# Patient Record
Sex: MALE | Race: ASIAN | NOT HISPANIC OR LATINO | URBAN - METROPOLITAN AREA
[De-identification: names, ages, dates, MRNs, and addresses within clinical notes are randomized per-mention and may not be internally consistent; named-entity substitution may affect disease eponyms.]

---

## 2020-02-27 ENCOUNTER — OFFICE VISIT - HEALTHEAST (OUTPATIENT)
Dept: CARDIOLOGY | Facility: CLINIC | Age: 40
End: 2020-02-27

## 2020-02-27 DIAGNOSIS — I34.0 MITRAL VALVE INSUFFICIENCY, UNSPECIFIED ETIOLOGY: ICD-10-CM

## 2020-03-17 ENCOUNTER — COMMUNICATION - HEALTHEAST (OUTPATIENT)
Dept: CARDIOLOGY | Facility: CLINIC | Age: 40
End: 2020-03-17

## 2020-03-18 ENCOUNTER — HOSPITAL ENCOUNTER (OUTPATIENT)
Dept: CARDIOLOGY | Facility: CLINIC | Age: 40
Discharge: HOME | End: 2020-03-18
Attending: INTERNAL MEDICINE

## 2020-03-18 DIAGNOSIS — I34.0 MITRAL VALVE INSUFFICIENCY, UNSPECIFIED ETIOLOGY: ICD-10-CM

## 2020-03-18 LAB
AORTIC ROOT: 3 CM
BSA FOR ECHO PROCEDURE: 1.87 M2
CV BLOOD PRESSURE: ABNORMAL MMHG
CV ECHO HEIGHT: 67 IN
CV ECHO WEIGHT: 164 LBS
DOP CALC LVOT AREA: 2.83 CM2
DOP CALC LVOT DIAMETER: 1.9 CM
DOP CALC LVOT PEAK VEL: 91.9 CM/S
DOP CALC LVOT STROKE VOLUME: 48.5 CM3
DOP CALCLVOT PEAK VEL VTI: 17.1 CM
EJECTION FRACTION: 62 % (ref 55–75)
FRACTIONAL SHORTENING: 35.5 % (ref 28–44)
INTERVENTRICULAR SEPTUM IN END DIASTOLE: 1.03 CM (ref 0.6–1)
IVS/PW RATIO: 0.8
LA AREA 1: 33.4 CM2
LA AREA 2: 28.2 CM2
LEFT ATRIUM LENGTH: 6.18 CM
LEFT ATRIUM SIZE: 4.2 CM
LEFT ATRIUM TO AORTIC ROOT RATIO: 1.4 NO UNITS
LEFT ATRIUM VOLUME INDEX: 69.3 ML/M2
LEFT ATRIUM VOLUME: 129.5 ML
LEFT VENTRICLE CARDIAC INDEX: 2 L/MIN/M2
LEFT VENTRICLE CARDIAC OUTPUT: 3.7 L/MIN
LEFT VENTRICLE DIASTOLIC VOLUME INDEX: 73.8 CM3/M2 (ref 34–74)
LEFT VENTRICLE DIASTOLIC VOLUME: 138 CM3 (ref 62–150)
LEFT VENTRICLE HEART RATE: 77 BPM
LEFT VENTRICLE MASS INDEX: 129.8 G/M2
LEFT VENTRICLE SYSTOLIC VOLUME INDEX: 27.8 CM3/M2 (ref 11–31)
LEFT VENTRICLE SYSTOLIC VOLUME: 52 CM3 (ref 21–61)
LEFT VENTRICULAR INTERNAL DIMENSION IN DIASTOLE: 5.35 CM (ref 4.2–5.8)
LEFT VENTRICULAR INTERNAL DIMENSION IN SYSTOLE: 3.45 CM (ref 2.5–4)
LEFT VENTRICULAR MASS: 242.8 G
LEFT VENTRICULAR OUTFLOW TRACT MEAN GRADIENT: 2 MMHG
LEFT VENTRICULAR OUTFLOW TRACT MEAN VELOCITY: 63.4 CM/S
LEFT VENTRICULAR OUTFLOW TRACT PEAK GRADIENT: 3 MMHG
LEFT VENTRICULAR POSTERIOR WALL IN END DIASTOLE: 1.25 CM (ref 0.6–1)
LV STROKE VOLUME INDEX: 25.9 ML/M2
MITRAL REGURGITANT VELOCITY TIME INTEGRAL: 144 CM
MITRAL VALVE E/A RATIO: 3.3
MR FLOW: 107 CM3
MR MEAN GRADIENT: 51 MMHG
MR MEAN GRADIENT: 51 MMHG
MR MEAN VELOCITY: 344 CM/S
MR MEAN VELOCITY: 344 CM/S
MR PEAK GRADIENT: 77.8 MMHG
MR PISA EROA: 0.7 CM2
MR PISA RADIUS: 1.3 CM
MR PISA VN NYQUIST: 30.8 CM/S
MV AVERAGE E/E' RATIO: 12.6 CM/S
MV E'TISSUE VEL-LAT: 13.3 CM/S
MV E'TISSUE VEL-MED: 11.8 CM/S
MV LATERAL E/E' RATIO: 11.9
MV MEDIAL E/E' RATIO: 13.4
MV PEAK A VELOCITY: 48 CM/S
MV PEAK E VELOCITY: 158 CM/S
MV REGURGITANT VOLUME: 106.7 CC
NUC REST DIASTOLIC VOLUME INDEX: 2624 LBS
NUC REST SYSTOLIC VOLUME INDEX: 67 IN
PISA MR PEAK VEL: 441 CM/S
TRICUSPID REGURGITATION PEAK PRESSURE GRADIENT: 20.4 MMHG
TRICUSPID VALVE ANULAR PLANE SYSTOLIC EXCURSION: 3 CM
TRICUSPID VALVE PEAK REGURGITANT VELOCITY: 226 CM/S

## 2020-03-18 ASSESSMENT — MIFFLIN-ST. JEOR: SCORE: 1617.53

## 2020-03-24 ENCOUNTER — COMMUNICATION - HEALTHEAST (OUTPATIENT)
Dept: CARDIOLOGY | Facility: CLINIC | Age: 40
End: 2020-03-24

## 2020-03-24 ENCOUNTER — AMBULATORY - HEALTHEAST (OUTPATIENT)
Dept: CARDIOLOGY | Facility: CLINIC | Age: 40
End: 2020-03-24

## 2020-03-24 DIAGNOSIS — I34.0 MITRAL VALVE INSUFFICIENCY, UNSPECIFIED ETIOLOGY: ICD-10-CM

## 2020-03-24 DIAGNOSIS — I34.0 NON-RHEUMATIC MITRAL REGURGITATION: ICD-10-CM

## 2021-06-01 ENCOUNTER — RECORDS - HEALTHEAST (OUTPATIENT)
Dept: ADMINISTRATIVE | Facility: CLINIC | Age: 41
End: 2021-06-01

## 2021-06-04 VITALS
SYSTOLIC BLOOD PRESSURE: 104 MMHG | HEART RATE: 84 BPM | RESPIRATION RATE: 16 BRPM | WEIGHT: 164 LBS | DIASTOLIC BLOOD PRESSURE: 70 MMHG | BODY MASS INDEX: 25.69 KG/M2

## 2021-06-04 VITALS — BODY MASS INDEX: 25.74 KG/M2 | HEIGHT: 67 IN | WEIGHT: 164 LBS

## 2021-06-07 NOTE — TELEPHONE ENCOUNTER
Notes recorded by Samantha Arias RN on 3/24/2020 at 11:05 AM CDT   Results and recommendations reviewed with patient. Patient is willing to proceed with PABLO. Informed patient a  will call him to arrange PABLO to be done in 2 to 3 months due to the COVID situation. Patient verbalized understanding and has no questions. Order placed and message sent to scheduling.

## 2021-06-07 NOTE — TELEPHONE ENCOUNTER
----- Message from Olga Balderas MD sent at 3/23/2020 11:47 AM CDT -----  Echocardiogram confirms once again significant mitral insufficiency.  Left ventricular function, however is well-preserved.  Patient's valve based on echocardiographic findings may be amenable for repair and feel this is worthy of further investigation.  Specifically, would recommend transesophageal echocardiogram.  There is no urgency, however, and this can be postponed for 2 to 3 months if needed given the COVID situation.  If patient is willing, lets go ahead and tentatively arrange for that in approximately 2 to 3 months.  Thanks.

## 2021-06-27 ENCOUNTER — HEALTH MAINTENANCE LETTER (OUTPATIENT)
Age: 41
End: 2021-06-27

## 2021-06-28 NOTE — PROGRESS NOTES
Progress Notes by Olga Balderas MD at 2/27/2020  2:50 PM     Author: Olga Balderas MD Service: -- Author Type: Physician    Filed: 2/27/2020  3:37 PM Encounter Date: 2/27/2020 Status: Signed    : Olga Balderas MD (Physician)                                       Impression and Plan     1.  Mitral insufficiency. Mimi has a history of severe mitral insufficiency.  Specifically, patient has a history of alpha streptococcal endocarditis involving mitral valve.  Patient had completed a 28-day of course of ceftriaxone in September 2016.  Patient noted at time of transesophageal echocardiogram 16 August 2016 to have severe mitral insufficiency (anteriorly directed jet).  Subjectively, patient is doing well.  Recommend:    Echocardiogram to not only reassess mitral insufficiency, but also left ventricular systolic performance.    Follow-up and further recommendations pending echocardiographic findings.         History of Present Illness    Once again I would like to thank you again for asking me to participate in the care of your patient, Ez Le.  As you know, but to reiterate for my own records, Ez Le is a 39 y.o. male with history of alpha streptococcal endocarditis involving mitral valve.  Patient had completed a 28-day of course of ceftriaxone in September 2016.  Patient noted at time of transesophageal echocardiogram 16 August 2016 to have severe mitral insufficiency (anteriorly directed jet).    On interview, patient states from a cardiovascular standpoint he has been doing well.  He denies any worsening shortness of breath or subjective decline in exercise tolerance.  He did recently have an evaluation for some chest discomfort though this was ultimately felt musculoskeletal in nature.  He has had no recurrence.  He denies any exertional chest pain.    Further review of systems is otherwise negative/noncontributory (medical record and 13 point review of systems  "reviewed as well and pertinent positives noted).         Cardiac Diagnostics      Transesophageal echocardiogram 16 August 2016:  1. Normal left ventricular size and systolic performance with ejection fraction of 60%.  2. Severe mitral insufficiency (anteriorly directed jet).  3. 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  4. Normal right ventricular size and systolic performance.  5. Normal atrial dimensions.    Echocardiogram 13 August 2016 (personally reviewed):  1. Normal left ventricular size and systolic performance. The ejection fraction is estimated to be 65-70%.  2. On selected views, there appears to be partial flail of the posterior mitral valve leaflet. There is moderate-severe mitral regurgitation. The mitral insufficiency jet is eccentric and anteriorly directed. No discrete valvular vegetation is detected.  3. The left atrium is mildly enlarged.           Physical Examination       /70   Pulse 84   Resp 16   Wt 164 lb (74.4 kg)   BMI 25.69 kg/m          Wt Readings from Last 3 Encounters:   02/27/20 164 lb (74.4 kg)   02/23/20 160 lb (72.6 kg)   04/11/18 180 lb (81.6 kg)       The patient is alert and oriented times three. Sclerae are anicteric. Mucosal membranes are moist. Jugular venous pressure is normal. No significant adenopathy/thyromegally appreciated. Lungs are clear with good expansion. On cardiovascular exam, the patient has a regular S1 and S2.  There is a 3/6 systolic murmur heard in a sash-like distribution though somewhat more prominent at apex.  Abdomen is soft and non-tender. Extremities reveal no clubbing, cyanosis, or edema.         Family History/Social History/Risk Factors   Patient does not smoke.  Family history reviewed, and family history includes Gout in his brother and father; Hyperlipidemia in his father and mother; Hypertension in his father and mother.          Medications  Allergies   Current Outpatient Medications "   Medication Sig Dispense Refill   ? acetaminophen (TYLENOL) 500 MG tablet Take 500-1,000 mg by mouth every 6 (six) hours as needed for pain.     ? fluticasone (FLONASE) 50 mcg/actuation nasal spray 2 sprays each nostril daily 16 g 12   ? guaiFENesin ER (MUCINEX) 600 mg 12 hr tablet Take 600-1,200 mg by mouth 2 (two) times a day as needed for congestion.       No current facility-administered medications for this visit.       No Known Allergies       Lab Results   Lab Results   Component Value Date     02/23/2020    K 3.8 02/23/2020     (H) 02/23/2020    CO2 28 02/23/2020    BUN 15 02/23/2020    CREATININE 1.06 02/23/2020    CALCIUM 8.8 02/23/2020     Lab Results   Component Value Date    WBC 7.2 02/23/2020    HGB 15.3 02/23/2020    HCT 44.3 02/23/2020    MCV 93 02/23/2020     02/23/2020     Lab Results   Component Value Date    BNP 22 08/13/2016     Lab Results   Component Value Date    TROPONINI <0.01 02/23/2020    TROPONINI <0.01 08/13/2016    TROPONINI <0.01 08/13/2016

## 2021-10-16 ENCOUNTER — HEALTH MAINTENANCE LETTER (OUTPATIENT)
Age: 41
End: 2021-10-16

## 2022-07-23 ENCOUNTER — HEALTH MAINTENANCE LETTER (OUTPATIENT)
Age: 42
End: 2022-07-23

## 2022-10-01 ENCOUNTER — HEALTH MAINTENANCE LETTER (OUTPATIENT)
Age: 42
End: 2022-10-01

## 2023-06-09 ENCOUNTER — HOSPITAL ENCOUNTER (EMERGENCY)
Facility: HOSPITAL | Age: 43
End: 2023-06-09

## 2023-08-06 ENCOUNTER — HEALTH MAINTENANCE LETTER (OUTPATIENT)
Age: 43
End: 2023-08-06